# Patient Record
Sex: MALE | Race: WHITE
[De-identification: names, ages, dates, MRNs, and addresses within clinical notes are randomized per-mention and may not be internally consistent; named-entity substitution may affect disease eponyms.]

---

## 2020-05-24 ENCOUNTER — HOSPITAL ENCOUNTER (INPATIENT)
Dept: HOSPITAL 95 - ER | Age: 75
LOS: 2 days | Discharge: HOME | DRG: 247 | End: 2020-05-26
Attending: INTERNAL MEDICINE | Admitting: INTERNAL MEDICINE
Payer: MEDICARE

## 2020-05-24 VITALS — HEIGHT: 70.98 IN | BODY MASS INDEX: 35.12 KG/M2 | WEIGHT: 250.89 LBS

## 2020-05-24 DIAGNOSIS — Z79.84: ICD-10-CM

## 2020-05-24 DIAGNOSIS — E78.5: ICD-10-CM

## 2020-05-24 DIAGNOSIS — I44.0: ICD-10-CM

## 2020-05-24 DIAGNOSIS — Z79.82: ICD-10-CM

## 2020-05-24 DIAGNOSIS — E11.9: ICD-10-CM

## 2020-05-24 DIAGNOSIS — I21.02: Primary | ICD-10-CM

## 2020-05-24 DIAGNOSIS — I10: ICD-10-CM

## 2020-05-24 DIAGNOSIS — E66.9: ICD-10-CM

## 2020-05-24 LAB
ALBUMIN SERPL BCP-MCNC: 3.4 G/DL (ref 3.4–5)
ALBUMIN SERPL BCP-MCNC: 3.5 G/DL (ref 3.4–5)
ALBUMIN/GLOB SERPL: 0.9 {RATIO} (ref 0.8–1.8)
ALBUMIN/GLOB SERPL: 0.9 {RATIO} (ref 0.8–1.8)
ALT SERPL W P-5'-P-CCNC: 79 U/L (ref 12–78)
ALT SERPL W P-5'-P-CCNC: 91 U/L (ref 12–78)
ANION GAP SERPL CALCULATED.4IONS-SCNC: 6 MMOL/L (ref 6–16)
ANION GAP SERPL CALCULATED.4IONS-SCNC: 9 MMOL/L (ref 6–16)
AST SERPL W P-5'-P-CCNC: 260 U/L (ref 12–37)
AST SERPL W P-5'-P-CCNC: 64 U/L (ref 12–37)
BASOPHILS # BLD AUTO: 0.05 K/MM3 (ref 0–0.23)
BASOPHILS # BLD AUTO: 0.08 K/MM3 (ref 0–0.23)
BASOPHILS NFR BLD AUTO: 1 % (ref 0–2)
BASOPHILS NFR BLD AUTO: 1 % (ref 0–2)
BILIRUB SERPL-MCNC: 0.6 MG/DL (ref 0.1–1)
BILIRUB SERPL-MCNC: 0.9 MG/DL (ref 0.1–1)
BUN SERPL-MCNC: 12 MG/DL (ref 8–24)
BUN SERPL-MCNC: 14 MG/DL (ref 8–24)
CALCIUM SERPL-MCNC: 8.6 MG/DL (ref 8.5–10.1)
CALCIUM SERPL-MCNC: 8.7 MG/DL (ref 8.5–10.1)
CHLORIDE SERPL-SCNC: 104 MMOL/L (ref 98–108)
CHLORIDE SERPL-SCNC: 105 MMOL/L (ref 98–108)
CHOLEST SERPL-MCNC: 197 MG/DL (ref 50–200)
CHOLEST/HDLC SERPL: 4.7 {RATIO}
CO2 SERPL-SCNC: 25 MMOL/L (ref 21–32)
CO2 SERPL-SCNC: 28 MMOL/L (ref 21–32)
CREAT SERPL-MCNC: 0.75 MG/DL (ref 0.6–1.2)
CREAT SERPL-MCNC: 0.84 MG/DL (ref 0.6–1.2)
DEPRECATED RDW RBC AUTO: 49.6 FL (ref 35.1–46.3)
DEPRECATED RDW RBC AUTO: 49.9 FL (ref 35.1–46.3)
EOSINOPHIL # BLD AUTO: 0.02 K/MM3 (ref 0–0.68)
EOSINOPHIL # BLD AUTO: 0.18 K/MM3 (ref 0–0.68)
EOSINOPHIL NFR BLD AUTO: 0 % (ref 0–6)
EOSINOPHIL NFR BLD AUTO: 3 % (ref 0–6)
ERYTHROCYTE [DISTWIDTH] IN BLOOD BY AUTOMATED COUNT: 13.5 % (ref 11.7–14.2)
ERYTHROCYTE [DISTWIDTH] IN BLOOD BY AUTOMATED COUNT: 13.5 % (ref 11.7–14.2)
GLOBULIN SER CALC-MCNC: 3.7 G/DL (ref 2.2–4)
GLOBULIN SER CALC-MCNC: 4.1 G/DL (ref 2.2–4)
GLUCOSE SERPL-MCNC: 226 MG/DL (ref 70–99)
GLUCOSE SERPL-MCNC: 231 MG/DL (ref 70–99)
HCT VFR BLD AUTO: 46.3 % (ref 37–53)
HCT VFR BLD AUTO: 47.8 % (ref 37–53)
HCT VFR BLD AUTO: 50.4 % (ref 37–53)
HDLC SERPL-MCNC: 42 MG/DL (ref 39–?)
HGB BLD-MCNC: 15.4 G/DL (ref 13.5–17.5)
HGB BLD-MCNC: 15.9 G/DL (ref 13.5–17.5)
HGB BLD-MCNC: 17 G/DL (ref 13.5–17.5)
IMM GRANULOCYTES # BLD AUTO: 0.05 K/MM3 (ref 0–0.1)
IMM GRANULOCYTES # BLD AUTO: 0.08 K/MM3 (ref 0–0.1)
IMM GRANULOCYTES NFR BLD AUTO: 1 % (ref 0–1)
IMM GRANULOCYTES NFR BLD AUTO: 1 % (ref 0–1)
LDLC SERPL CALC-MCNC: 133 MG/DL (ref 0–110)
LDLC/HDLC SERPL: 3.2 {RATIO}
LYMPHOCYTES # BLD AUTO: 1.03 K/MM3 (ref 0.84–5.2)
LYMPHOCYTES # BLD AUTO: 2.1 K/MM3 (ref 0.84–5.2)
LYMPHOCYTES NFR BLD AUTO: 12 % (ref 21–46)
LYMPHOCYTES NFR BLD AUTO: 31 % (ref 21–46)
MAGNESIUM SERPL-MCNC: 2.2 MG/DL (ref 1.6–2.4)
MCHC RBC AUTO-ENTMCNC: 33.3 G/DL (ref 31.5–36.5)
MCHC RBC AUTO-ENTMCNC: 33.7 G/DL (ref 31.5–36.5)
MCV RBC AUTO: 98 FL (ref 80–100)
MCV RBC AUTO: 99 FL (ref 80–100)
MONOCYTES # BLD AUTO: 0.53 K/MM3 (ref 0.16–1.47)
MONOCYTES # BLD AUTO: 0.61 K/MM3 (ref 0.16–1.47)
MONOCYTES NFR BLD AUTO: 6 % (ref 4–13)
MONOCYTES NFR BLD AUTO: 9 % (ref 4–13)
NEUTROPHILS # BLD AUTO: 3.75 K/MM3 (ref 1.96–9.15)
NEUTROPHILS # BLD AUTO: 6.64 K/MM3 (ref 1.96–9.15)
NEUTROPHILS NFR BLD AUTO: 55 % (ref 41–73)
NEUTROPHILS NFR BLD AUTO: 80 % (ref 41–73)
NRBC # BLD AUTO: 0 K/MM3 (ref 0–0.02)
NRBC # BLD AUTO: 0 K/MM3 (ref 0–0.02)
NRBC BLD AUTO-RTO: 0 /100 WBC (ref 0–0.2)
NRBC BLD AUTO-RTO: 0 /100 WBC (ref 0–0.2)
PLATELET # BLD AUTO: 144 K/MM3 (ref 150–400)
PLATELET # BLD AUTO: 145 K/MM3 (ref 150–400)
POTASSIUM SERPL-SCNC: 4 MMOL/L (ref 3.5–5.5)
POTASSIUM SERPL-SCNC: 4.3 MMOL/L (ref 3.5–5.5)
PROT SERPL-MCNC: 7.1 G/DL (ref 6.4–8.2)
PROT SERPL-MCNC: 7.6 G/DL (ref 6.4–8.2)
PROTHROMBIN TIME: 10.5 SEC (ref 9.7–11.5)
SODIUM SERPL-SCNC: 138 MMOL/L (ref 136–145)
SODIUM SERPL-SCNC: 139 MMOL/L (ref 136–145)
TRIGL SERPL-MCNC: 112 MG/DL (ref 30–160)
TROPONIN I SERPL-MCNC: 0.67 NG/ML (ref 0–0.04)
VLDLC SERPL CALC-MCNC: 22 MG/DL (ref 6–32)

## 2020-05-24 PROCEDURE — C1751 CATH, INF, PER/CENT/MIDLINE: HCPCS

## 2020-05-24 PROCEDURE — C1874 STENT, COATED/COV W/DEL SYS: HCPCS

## 2020-05-24 PROCEDURE — 0271366 DILATION OF CORONARY ARTERY, TWO ARTERIES, BIFURCATION, WITH THREE DRUG-ELUTING INTRALUMINAL DEVICES, PERCUTANEOUS APPROACH: ICD-10-PCS | Performed by: INTERNAL MEDICINE

## 2020-05-24 PROCEDURE — C1769 GUIDE WIRE: HCPCS

## 2020-05-24 PROCEDURE — C1725 CATH, TRANSLUMIN NON-LASER: HCPCS

## 2020-05-24 PROCEDURE — B2111ZZ FLUOROSCOPY OF MULTIPLE CORONARY ARTERIES USING LOW OSMOLAR CONTRAST: ICD-10-PCS | Performed by: INTERNAL MEDICINE

## 2020-05-24 PROCEDURE — B241ZZ3 ULTRASONOGRAPHY OF MULTIPLE CORONARY ARTERIES, INTRAVASCULAR: ICD-10-PCS | Performed by: INTERNAL MEDICINE

## 2020-05-24 PROCEDURE — C9601 PERC DRUG-EL COR STENT BRAN: HCPCS

## 2020-05-24 PROCEDURE — 4A023N7 MEASUREMENT OF CARDIAC SAMPLING AND PRESSURE, LEFT HEART, PERCUTANEOUS APPROACH: ICD-10-PCS | Performed by: INTERNAL MEDICINE

## 2020-05-24 PROCEDURE — C9606 PERC D-E COR REVASC W AMI S: HCPCS

## 2020-05-24 PROCEDURE — C1887 CATHETER, GUIDING: HCPCS

## 2020-05-24 PROCEDURE — C1894 INTRO/SHEATH, NON-LASER: HCPCS

## 2020-05-24 PROCEDURE — C1753 CATH, INTRAVAS ULTRASOUND: HCPCS

## 2020-05-24 NOTE — NUR
ASSISTED OOB TO CHAIR FOR DINNER. GOWN AND LINEN CHANGE COMPLETED BY WAQAR LYNN. RIGHT RADIAL SITE REMAINS CLEAR-NO BLEEDING OR HEMATOMA. PT TOLERATED
WELL.

## 2020-05-24 NOTE — NUR
DR. DOMINGUEZ NOTIFIED THAT PT REMAINS HYPERTENSIVE. UPDATED TO CURRENT VS AND
STATUS. MD AWARE THAT TR BAND REMAINS IN PLACE AS PT CONTINUES TO USE HIS
RIGHT ARM DESPITE CONTINUAL REMINDERS NOT TO DO SO. ORDER GIVEN TO GIVE
AMLODIPINE 10 MG PO NOW X 1, THEN DAILY THEREAFTER.

## 2020-05-24 NOTE — NUR
NITROGLYCERIN TITRATED UP TO 40 MCG/MIN IN ORDER TO GET 'S. JANA
FROM LAB [facility]. SHORTLY AFTER BLOOD DRAW, PT REPORTED TO JANA THAT
HE DIDN'T FEEL QUITE RIGHT AND WAS NAUSEATED. JANA SUMMONED RN INTO ROOM.
PT PALE AND DIAPHORETIC. HR 50'S, AND SBP 80'S. NITRO DRIP DISCONTINUED, NS
250 BOLUS INITIATED,& 12 LEAD ECG DONE. PT MED WITH ZOFRAN IV FOR NAUSEA.  DR. DOMINGUEZ PRESENT AT BEDSIDE @ 1250. STAT ECHO AND H&H ORDERED. ONCE APROX. 100
CC OF BOLUS INFUSED AND NIRTO DRIP DISCONTINUED FOR APROX 5-10 MINUTES, SBP
110'S.

## 2020-05-24 NOTE — NUR
DR. DOMINGUEZ CONTACTED REGARDING CONTINUED HYPERTENSION AND PT NOW REPORTING
LEFT CHEST TIGHTNESS 2/10 THAT RADIATES TO LEFT SCAPULA. PT PLACED ON 2 LITERS
NASAL CANULA. ORDER GIVEN BY DR. DOMINGUEZ TO INITIATE NITRO DRIP.

## 2020-05-24 NOTE — NUR
PT UP TO ICU 8 FROM CATH LAB VIA BED. PT A&O. SBP CURRENTLY 130-150, HR IN THE
70S. LUNG SOUNDS CLEAR, SPO2 >90% ON RA. R RADIAL TR BAND IN PLACE WITH 11CCS.
PT IS TALKATIVE, HOWEVER IS WORD SEARCHING AND STATED THAT HE "KNOWS WHAT TO
SAY, BUT CANT GET IT OUT". PT IS SOMEWHAT FRUSTRATED BY THAT AND STATED THAT
THIS IS NOT TYPICAL FOR HIM. DURING ADMIT ATTEMPTED TO GET PT TO ASK HISTORY
AND PHYSICAL QUESTIONS BUT PT HAD A HARD TIME ANSWERING QUESTIONS. PUPILS ARE
EQUAL PT SMILE IS SYMMETRICAL AND STRENGTH IS EQUAL BILATERALLY. DR DOMINGUEZ
VISITED PT. NO NEW ORDERS RECEIVED.

## 2020-05-24 NOTE — NUR
PT HAS RESTED QUIETLY FOR MOST OF THE AFTERNOON AND EARLY EVENING. PT DENIES
PAIN OF SOB. SBP TRENDING 140-150'S. RIGHT RADIAL TR BAND REMOVED AND
OCCLUSIVE DRESSING PLACED. NO ACUTE BLEEDING OR HEMATOMA. ARMBOARD REMAINS IN
PLACE. RIGHT HAND REMAINS PINK AND WARM. NO NUMBNESS OR TINGLING. SPO2 WITH
GOOD PLETH.

## 2020-05-24 NOTE — NUR
PATIENT RESTING QUIETLY IN BED. WHEN SLEEPING HAS SLIGHT SNORING RESP WITH
BIOX OCCASIONALLY DROPPING TO 89% ON RA. RIGHT WRIST WITH BRACE IN PLACE
DRESSING TO RIGHT WRIST CD&I NO SWELLING OR OOZING SEEN. PATIENT NEEDING
FREQUENT REMINDING TO NOT PUT ANY WEIGHT ON THAT HAND OR WRIST. PATIENT UP TO
BSC WITH MIN ASSIST. NOT ABLE TO HAVE BM AT THIS TIME, PASSING FLATUS. NO C/O
CHEST PAIN OF OR SOB WITH GETTING UP. C/O PAIN TO LEFT LEG WHICH IS CHRONIC IN
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

## 2020-05-24 NOTE — NUR
PT A&0X4. PT VERY TALKATIVE. DENIES CP OR NAUSEA. ECG SHOWS SR WITH FIRST
DEGREE AV BLOCK. BP TRENDING 180'S/100'S. SCHEDULED DOSE OF ZESTRIL GIVEN-SEE
EMAR. TR BAND TO RIGHT RADIAL SITE WITH 9CC AIR TO CUFF. ARMBOARD IN PLACE. PT
GIVEN FREQUENT REMINDERS TO NOT USE HIS RIGHT ARM. DESPITE VERBALIZING
UNDERSTANDING, PT CONTINUES TO USE HIS RIGHT ARM. RIGHT RADIAL SITE REMAINS
CLEAR, NO BLEEDING OR HEMATOMA. RIGHT HAND PINK AND WARM-SPO2 WITH GOOD PLETH.
LUNGS CLEAR. SATS>90% ON RA. CARDIAC BREAKFAST TRAY GIVEN. PT DAUGHTER,
NOE CALLED AND UPDATED PER PT REQUEST.

## 2020-05-25 LAB
ANION GAP SERPL CALCULATED.4IONS-SCNC: 6 MMOL/L (ref 6–16)
BASOPHILS # BLD AUTO: 0.05 K/MM3 (ref 0–0.23)
BASOPHILS NFR BLD AUTO: 1 % (ref 0–2)
BUN SERPL-MCNC: 9 MG/DL (ref 8–24)
CALCIUM SERPL-MCNC: 8.5 MG/DL (ref 8.5–10.1)
CHLORIDE SERPL-SCNC: 105 MMOL/L (ref 98–108)
CO2 SERPL-SCNC: 27 MMOL/L (ref 21–32)
CREAT SERPL-MCNC: 0.71 MG/DL (ref 0.6–1.2)
DEPRECATED RDW RBC AUTO: 51.4 FL (ref 35.1–46.3)
EOSINOPHIL # BLD AUTO: 0.07 K/MM3 (ref 0–0.68)
EOSINOPHIL NFR BLD AUTO: 1 % (ref 0–6)
ERYTHROCYTE [DISTWIDTH] IN BLOOD BY AUTOMATED COUNT: 13.9 % (ref 11.7–14.2)
GLUCOSE SERPL-MCNC: 253 MG/DL (ref 70–99)
HCT VFR BLD AUTO: 46.1 % (ref 37–53)
HGB BLD-MCNC: 15.1 G/DL (ref 13.5–17.5)
IMM GRANULOCYTES # BLD AUTO: 0.05 K/MM3 (ref 0–0.1)
IMM GRANULOCYTES NFR BLD AUTO: 1 % (ref 0–1)
LYMPHOCYTES # BLD AUTO: 1.1 K/MM3 (ref 0.84–5.2)
LYMPHOCYTES NFR BLD AUTO: 15 % (ref 21–46)
MCHC RBC AUTO-ENTMCNC: 32.8 G/DL (ref 31.5–36.5)
MCV RBC AUTO: 100 FL (ref 80–100)
MONOCYTES # BLD AUTO: 0.72 K/MM3 (ref 0.16–1.47)
MONOCYTES NFR BLD AUTO: 10 % (ref 4–13)
NEUTROPHILS # BLD AUTO: 5.46 K/MM3 (ref 1.96–9.15)
NEUTROPHILS NFR BLD AUTO: 73 % (ref 41–73)
NRBC # BLD AUTO: 0 K/MM3 (ref 0–0.02)
NRBC BLD AUTO-RTO: 0 /100 WBC (ref 0–0.2)
PLATELET # BLD AUTO: 129 K/MM3 (ref 150–400)
POTASSIUM SERPL-SCNC: 4.2 MMOL/L (ref 3.5–5.5)
SODIUM SERPL-SCNC: 138 MMOL/L (ref 136–145)

## 2020-05-25 NOTE — NUR
SUMMARY
PATIENT RESTING QUIETLY T/O NIGHT. RIGHT WRIST WITH DRESSING CD&I NO SWELLING
OR OOZING SEEN. ARM BOARD REMAINS IN PLACE, PATIENT DOING BETTER WITH NOT
USING THE RIGHT HAND OR WRIST. CONTINUES TO HAVE NO COMPLAINTS OF CHEST PAIN.
PATIENT ABLE TO AMBULATE TO TOILET WITH MINIMAL ASSIST.

## 2020-05-25 NOTE — NUR
SUMMARY
 
No acute changes to initial assessment. Pt A&O x 4. SpO2 90% or greater RA. SR
with first degree AV block. BP stable. OOB several times this shift to use
bathroom. Tolerates activity well without any chest pain or shortness of
breath. Right transradial access dressing C/D/I. Free of drainage, bruising,
or hematoma. Color, sensation, distal pulses, capillary refill equal BUE. Will
continue to closely monitor until care handoff and bedside report with
oncoming RN.

## 2020-05-25 NOTE — NUR
BEGINNING OF SHIFT
 
Assumed care at 0700. Bedside report recieved from Mere ZAVALETA. Pt A&O x 4. On room
air. SR per monitor with first degree HB. BP stable. Denies chest pain or
shortness of breath. Bed in lowest position. Call light in reach. Pt denies
need at this time.

## 2020-05-25 NOTE — NUR
PATIENT RESTING IN BED WATCHING TABLET, NO C/O PAIN. REPOSITIONING SELF IN BED
WITHOUT DIFFICULTY. RIGHT WRIST WITH DRESSING CD&I WITH NO SWELLING OR OOZING
SEEN. PATIENT NOW PCU STATUS.

## 2020-05-25 NOTE — NUR
DR DOMINGUEZ IN TO SEE PT
 
States pt will be PCU status. Expected discharge tomorrow. EKG obtained.

## 2020-05-26 NOTE — NUR
Met with Mr. Quintana to offer prayer and .  He explained that he lsot his
beloved wife 3 years ago and suspects he is still grieving.  I provided
breavement education and gentle  to good effect.  He admitted to me
that he is facing a great deal of change.  He was tearful and clearly
overwhelmed.  He was also in the process of being d/c'd.  Prayer provided and
I gave him my contact information for bereavement .

## 2020-05-26 NOTE — NUR
SUMMARY
PATIENT SLEEPING OFF AND ON T/O NIGHT. ABLE TO BE INDEPENDENT IN THE ROOM. NO
C/O PAIN OR SOB. RIGHT WRIST SITE WITH DRESSING CD&I WITH NO SWELLING OR
OOZING SEEN.

## 2020-05-26 NOTE — NUR
DISCHARGE INSTRUCTIONS GONE OVER WITH PT. CALLED DIETICIAN TO ASSIST WITH
DIABETES EDUCATION. INSTRUCTED PT ON ESTABLISH PCP AND SEEK FOLLOW UP CARE.
INSTRUCTED PT ON FOLLOW UP APPT WITH DR DOMINGUEZ. PROVIDED IN DEPTH EDUCATION
ON NEW PRESCRIPTIONS AND HOW TO OBTAIN THEM. PT STATED UNDERSTANDING.
BELONGINGS GATHERED AND GIVEN TO PT. PT ESCORTED OUT VIA WHEELCHAIR TO FAMILY
BY TANISHA CHAPMAN.

## 2021-01-26 NOTE — NUR
01/26/21 0723 Tamera Salmeron
2 IV ATTEMPTS BY [facility], 1ST ATTEMPT IN HAND WAS TOO PAINFUL AND 2ND IV
ATTEMPT IN L FOREARM WAS SUCCESSFUL

## 2021-01-26 NOTE — NUR
01/26/21 0855 JUANITA WALTERS
PT UP TO RECLINER WITH SBA. VSS ON ROOM AIR. TOLERATING PO INTAKE. EYE
SHIELD IN PLACE. PT DENIES PAIN/NAUSEA AT THIS TIME. ENGAGED IN DC
TEACHING AND ALL QUESTIONS ASKED AND ANSWERED. IV DC'D. PT DC AMB TO
VEHICLE.